# Patient Record
Sex: FEMALE | Race: WHITE | NOT HISPANIC OR LATINO | URBAN - METROPOLITAN AREA
[De-identification: names, ages, dates, MRNs, and addresses within clinical notes are randomized per-mention and may not be internally consistent; named-entity substitution may affect disease eponyms.]

---

## 2017-06-09 ENCOUNTER — INPATIENT (INPATIENT)
Facility: HOSPITAL | Age: 52
LOS: 1 days | Discharge: HOME | DRG: 552 | End: 2017-06-11
Attending: SURGERY | Admitting: SURGERY
Payer: COMMERCIAL

## 2017-06-09 VITALS
OXYGEN SATURATION: 98 % | TEMPERATURE: 98 F | SYSTOLIC BLOOD PRESSURE: 132 MMHG | DIASTOLIC BLOOD PRESSURE: 76 MMHG | HEART RATE: 82 BPM | RESPIRATION RATE: 18 BRPM

## 2017-06-09 DIAGNOSIS — S22.000A WEDGE COMPRESSION FRACTURE OF UNSPECIFIED THORACIC VERTEBRA, INITIAL ENCOUNTER FOR CLOSED FRACTURE: ICD-10-CM
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
## 2017-06-09 DIAGNOSIS — S22.009A UNSPECIFIED FRACTURE OF UNSPECIFIED THORACIC VERTEBRA, INITIAL ENCOUNTER FOR CLOSED FRACTURE: ICD-10-CM

## 2017-06-09 LAB
ALBUMIN SERPL ELPH-MCNC: 4.1 G/DL — SIGNIFICANT CHANGE UP (ref 3.3–5.2)
ALP SERPL-CCNC: 105 U/L — SIGNIFICANT CHANGE UP (ref 40–120)
ALT FLD-CCNC: 15 U/L — SIGNIFICANT CHANGE UP
AMYLASE P1 CFR SERPL: 72 U/L — SIGNIFICANT CHANGE UP (ref 36–128)
ANION GAP SERPL CALC-SCNC: 15 MMOL/L — SIGNIFICANT CHANGE UP (ref 5–17)
APTT BLD: 28.9 SEC — SIGNIFICANT CHANGE UP (ref 27.5–37.4)
AST SERPL-CCNC: 14 U/L — SIGNIFICANT CHANGE UP
BASOPHILS # BLD AUTO: 0.1 K/UL — SIGNIFICANT CHANGE UP (ref 0–0.2)
BASOPHILS NFR BLD AUTO: 0.7 % — SIGNIFICANT CHANGE UP (ref 0–2)
BILIRUB SERPL-MCNC: 0.4 MG/DL — SIGNIFICANT CHANGE UP (ref 0.4–2)
BLD GP AB SCN SERPL QL: SIGNIFICANT CHANGE UP
BUN SERPL-MCNC: 17 MG/DL — SIGNIFICANT CHANGE UP (ref 8–20)
CALCIUM SERPL-MCNC: 9.1 MG/DL — SIGNIFICANT CHANGE UP (ref 8.6–10.2)
CHLORIDE SERPL-SCNC: 105 MMOL/L — SIGNIFICANT CHANGE UP (ref 98–107)
CO2 SERPL-SCNC: 22 MMOL/L — SIGNIFICANT CHANGE UP (ref 22–29)
CREAT SERPL-MCNC: 0.66 MG/DL — SIGNIFICANT CHANGE UP (ref 0.5–1.3)
EOSINOPHIL # BLD AUTO: 0.3 K/UL — SIGNIFICANT CHANGE UP (ref 0–0.5)
EOSINOPHIL NFR BLD AUTO: 3.6 % — SIGNIFICANT CHANGE UP (ref 0–6)
ETHANOL SERPL-MCNC: <10 MG/DL — SIGNIFICANT CHANGE UP
GLUCOSE SERPL-MCNC: 115 MG/DL — SIGNIFICANT CHANGE UP (ref 70–115)
HCT VFR BLD CALC: 44.5 % — SIGNIFICANT CHANGE UP (ref 37–47)
HGB BLD-MCNC: 14.7 G/DL — SIGNIFICANT CHANGE UP (ref 12–16)
INR BLD: 0.92 RATIO — SIGNIFICANT CHANGE UP (ref 0.88–1.16)
LIDOCAIN IGE QN: 36 U/L — SIGNIFICANT CHANGE UP (ref 22–51)
LYMPHOCYTES # BLD AUTO: 2.9 K/UL — SIGNIFICANT CHANGE UP (ref 1–4.8)
LYMPHOCYTES # BLD AUTO: 35.4 % — SIGNIFICANT CHANGE UP (ref 20–55)
MCHC RBC-ENTMCNC: 31.8 PG — HIGH (ref 27–31)
MCHC RBC-ENTMCNC: 33 G/DL — SIGNIFICANT CHANGE UP (ref 32–36)
MCV RBC AUTO: 96.3 FL — SIGNIFICANT CHANGE UP (ref 81–99)
MONOCYTES # BLD AUTO: 0.7 K/UL — SIGNIFICANT CHANGE UP (ref 0–0.8)
MONOCYTES NFR BLD AUTO: 8.1 % — SIGNIFICANT CHANGE UP (ref 3–10)
NEUTROPHILS # BLD AUTO: 4.3 K/UL — SIGNIFICANT CHANGE UP (ref 1.8–8)
NEUTROPHILS NFR BLD AUTO: 51.8 % — SIGNIFICANT CHANGE UP (ref 37–73)
PLATELET # BLD AUTO: 273 K/UL — SIGNIFICANT CHANGE UP (ref 150–400)
POTASSIUM SERPL-MCNC: 4.2 MMOL/L — SIGNIFICANT CHANGE UP (ref 3.5–5.3)
POTASSIUM SERPL-SCNC: 4.2 MMOL/L — SIGNIFICANT CHANGE UP (ref 3.5–5.3)
PROT SERPL-MCNC: 7 G/DL — SIGNIFICANT CHANGE UP (ref 6.6–8.7)
PROTHROM AB SERPL-ACNC: 10.1 SEC — SIGNIFICANT CHANGE UP (ref 9.8–12.7)
RBC # BLD: 4.62 M/UL — SIGNIFICANT CHANGE UP (ref 4.4–5.2)
RBC # FLD: 14 % — SIGNIFICANT CHANGE UP (ref 11–15.6)
SODIUM SERPL-SCNC: 142 MMOL/L — SIGNIFICANT CHANGE UP (ref 135–145)
TYPE + AB SCN PNL BLD: SIGNIFICANT CHANGE UP
WBC # BLD: 8.3 K/UL — SIGNIFICANT CHANGE UP (ref 4.8–10.8)
WBC # FLD AUTO: 8.3 K/UL — SIGNIFICANT CHANGE UP (ref 4.8–10.8)

## 2017-06-09 PROCEDURE — 72146 MRI CHEST SPINE W/O DYE: CPT | Mod: 26

## 2017-06-09 PROCEDURE — 73030 X-RAY EXAM OF SHOULDER: CPT | Mod: 26,LT

## 2017-06-09 PROCEDURE — 71010: CPT | Mod: 26

## 2017-06-09 PROCEDURE — 93010 ELECTROCARDIOGRAM REPORT: CPT

## 2017-06-09 PROCEDURE — 72125 CT NECK SPINE W/O DYE: CPT | Mod: 26

## 2017-06-09 PROCEDURE — 70450 CT HEAD/BRAIN W/O DYE: CPT | Mod: 26

## 2017-06-09 PROCEDURE — 99291 CRITICAL CARE FIRST HOUR: CPT

## 2017-06-09 PROCEDURE — 71260 CT THORAX DX C+: CPT | Mod: 26

## 2017-06-09 PROCEDURE — 74177 CT ABD & PELVIS W/CONTRAST: CPT | Mod: 26

## 2017-06-09 RX ORDER — SODIUM CHLORIDE 9 MG/ML
1000 INJECTION, SOLUTION INTRAVENOUS
Qty: 0 | Refills: 0 | Status: DISCONTINUED | OUTPATIENT
Start: 2017-06-09 | End: 2017-06-11

## 2017-06-09 RX ORDER — ACETAMINOPHEN 500 MG
650 TABLET ORAL ONCE
Qty: 0 | Refills: 0 | Status: COMPLETED | OUTPATIENT
Start: 2017-06-09 | End: 2017-06-09

## 2017-06-09 RX ORDER — MORPHINE SULFATE 50 MG/1
4 CAPSULE, EXTENDED RELEASE ORAL EVERY 4 HOURS
Qty: 0 | Refills: 0 | Status: DISCONTINUED | OUTPATIENT
Start: 2017-06-09 | End: 2017-06-11

## 2017-06-09 RX ORDER — NICOTINE POLACRILEX 2 MG
1 GUM BUCCAL DAILY
Qty: 0 | Refills: 0 | Status: DISCONTINUED | OUTPATIENT
Start: 2017-06-09 | End: 2017-06-11

## 2017-06-09 RX ORDER — ENOXAPARIN SODIUM 100 MG/ML
40 INJECTION SUBCUTANEOUS EVERY 24 HOURS
Qty: 0 | Refills: 0 | Status: DISCONTINUED | OUTPATIENT
Start: 2017-06-09 | End: 2017-06-11

## 2017-06-09 RX ORDER — DOCUSATE SODIUM 100 MG
100 CAPSULE ORAL THREE TIMES A DAY
Qty: 0 | Refills: 0 | Status: DISCONTINUED | OUTPATIENT
Start: 2017-06-09 | End: 2017-06-11

## 2017-06-09 RX ADMIN — SODIUM CHLORIDE 100 MILLILITER(S): 9 INJECTION, SOLUTION INTRAVENOUS at 15:43

## 2017-06-09 RX ADMIN — MORPHINE SULFATE 4 MILLIGRAM(S): 50 CAPSULE, EXTENDED RELEASE ORAL at 20:55

## 2017-06-09 RX ADMIN — MORPHINE SULFATE 4 MILLIGRAM(S): 50 CAPSULE, EXTENDED RELEASE ORAL at 21:00

## 2017-06-09 RX ADMIN — Medication 100 MILLIGRAM(S): at 21:39

## 2017-06-09 RX ADMIN — Medication 650 MILLIGRAM(S): at 13:20

## 2017-06-09 RX ADMIN — Medication 1 PATCH: at 17:17

## 2017-06-09 RX ADMIN — Medication 650 MILLIGRAM(S): at 13:52
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
## 2017-06-09 NOTE — ED ADULT NURSE REASSESSMENT NOTE - NS ED NURSE REASSESS COMMENT FT1
Ortho PA at bedside, pt stable, resp even unlabored, family and pt aware of plan of care. will continue to monitor
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
## 2017-06-09 NOTE — ED PROVIDER NOTE - MUSCULOSKELETAL, MLM
Spine appears normal, range of motion is not limited, when logged rolled pos bilateral knee and left shoulder tenderness mid thoracic and sacral tenderness as per tt exam
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
## 2017-06-09 NOTE — H&P ADULT - NSHPREVIEWOFSYSTEMS_GEN_ALL_CORE
General: denies fever, chills  Head: denies HA, N/V  Cardiac: denies HTN, murmurs  Respiratory: Admits to being a 1-1.5 PPD smoker since she was 17 y/o.  Denies SOB, cough  GI: Denies diarrhea, constipation  Urinary: Denies urgency or incontinence  MS: Admits to L shoulder pain.  Denies weakness.

## 2017-06-09 NOTE — ED PROVIDER NOTE - CONSTITUTIONAL, MLM
normal... Well appearing, well nourished, awake, alert, oriented to person, place, time/situation and anxious
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
## 2017-06-09 NOTE — H&P ADULT - NSHPPHYSICALEXAM_GEN_ALL_CORE
Constitutional: BIBEMS, nervous  Eyes: PERRLA, EOM intact, pupil diameter=3mm  Head: NCAT  Neck: trachea midline, CCollar in place, cervical spine tenderness   Respiratory: CTA b/l, no WRR  Cardiovascular: S1S2, RRR  Gastrointestinal: abd. obese, soft, NT/ND, +BSx4   Extremities: no LE edema b/l, FROMx4, L shoulder tenderness on palpation  Neurological: AOx3, sensation grossly intact  Skin: warm, dry, intact, ecchymosis over L shoulder

## 2017-06-10 DIAGNOSIS — S22.009A UNSPECIFIED FRACTURE OF UNSPECIFIED THORACIC VERTEBRA, INITIAL ENCOUNTER FOR CLOSED FRACTURE: ICD-10-CM

## 2017-06-10 LAB
ANION GAP SERPL CALC-SCNC: 13 MMOL/L — SIGNIFICANT CHANGE UP (ref 5–17)
BASOPHILS # BLD AUTO: 0.1 K/UL — SIGNIFICANT CHANGE UP (ref 0–0.2)
BASOPHILS NFR BLD AUTO: 1.1 % — SIGNIFICANT CHANGE UP (ref 0–2)
BUN SERPL-MCNC: 11 MG/DL — SIGNIFICANT CHANGE UP (ref 8–20)
CALCIUM SERPL-MCNC: 8.8 MG/DL — SIGNIFICANT CHANGE UP (ref 8.6–10.2)
CHLORIDE SERPL-SCNC: 105 MMOL/L — SIGNIFICANT CHANGE UP (ref 98–107)
CO2 SERPL-SCNC: 24 MMOL/L — SIGNIFICANT CHANGE UP (ref 22–29)
CREAT SERPL-MCNC: 0.65 MG/DL — SIGNIFICANT CHANGE UP (ref 0.5–1.3)
EOSINOPHIL # BLD AUTO: 0.3 K/UL — SIGNIFICANT CHANGE UP (ref 0–0.5)
EOSINOPHIL NFR BLD AUTO: 3.6 % — SIGNIFICANT CHANGE UP (ref 0–6)
GLUCOSE SERPL-MCNC: 94 MG/DL — SIGNIFICANT CHANGE UP (ref 70–115)
HCT VFR BLD CALC: 41.6 % — SIGNIFICANT CHANGE UP (ref 37–47)
HGB BLD-MCNC: 13.7 G/DL — SIGNIFICANT CHANGE UP (ref 12–16)
LACTATE SERPL-SCNC: 1.1 MMOL/L — SIGNIFICANT CHANGE UP (ref 0.5–2)
LYMPHOCYTES # BLD AUTO: 3.8 K/UL — SIGNIFICANT CHANGE UP (ref 1–4.8)
LYMPHOCYTES # BLD AUTO: 45.5 % — SIGNIFICANT CHANGE UP (ref 20–55)
MAGNESIUM SERPL-MCNC: 2 MG/DL — SIGNIFICANT CHANGE UP (ref 1.6–2.6)
MCHC RBC-ENTMCNC: 31.8 PG — HIGH (ref 27–31)
MCHC RBC-ENTMCNC: 32.9 G/DL — SIGNIFICANT CHANGE UP (ref 32–36)
MCV RBC AUTO: 96.5 FL — SIGNIFICANT CHANGE UP (ref 81–99)
MONOCYTES # BLD AUTO: 0.7 K/UL — SIGNIFICANT CHANGE UP (ref 0–0.8)
MONOCYTES NFR BLD AUTO: 8 % — SIGNIFICANT CHANGE UP (ref 3–10)
NEUTROPHILS # BLD AUTO: 3.4 K/UL — SIGNIFICANT CHANGE UP (ref 1.8–8)
NEUTROPHILS NFR BLD AUTO: 41.6 % — SIGNIFICANT CHANGE UP (ref 37–73)
PHOSPHATE SERPL-MCNC: 3.4 MG/DL — SIGNIFICANT CHANGE UP (ref 2.4–4.7)
PLATELET # BLD AUTO: 246 K/UL — SIGNIFICANT CHANGE UP (ref 150–400)
POTASSIUM SERPL-MCNC: 3.7 MMOL/L — SIGNIFICANT CHANGE UP (ref 3.5–5.3)
POTASSIUM SERPL-SCNC: 3.7 MMOL/L — SIGNIFICANT CHANGE UP (ref 3.5–5.3)
RBC # BLD: 4.31 M/UL — LOW (ref 4.4–5.2)
RBC # FLD: 13.9 % — SIGNIFICANT CHANGE UP (ref 11–15.6)
SODIUM SERPL-SCNC: 142 MMOL/L — SIGNIFICANT CHANGE UP (ref 135–145)
WBC # BLD: 8.3 K/UL — SIGNIFICANT CHANGE UP (ref 4.8–10.8)
WBC # FLD AUTO: 8.3 K/UL — SIGNIFICANT CHANGE UP (ref 4.8–10.8)

## 2017-06-10 PROCEDURE — 93010 ELECTROCARDIOGRAM REPORT: CPT

## 2017-06-10 RX ORDER — POTASSIUM CHLORIDE 20 MEQ
40 PACKET (EA) ORAL ONCE
Qty: 0 | Refills: 0 | Status: DISCONTINUED | OUTPATIENT
Start: 2017-06-10 | End: 2017-06-10

## 2017-06-10 RX ORDER — LANOLIN ALCOHOL/MO/W.PET/CERES
3 CREAM (GRAM) TOPICAL AT BEDTIME
Qty: 0 | Refills: 0 | Status: DISCONTINUED | OUTPATIENT
Start: 2017-06-10 | End: 2017-06-11

## 2017-06-10 RX ORDER — POTASSIUM CHLORIDE 20 MEQ
40 PACKET (EA) ORAL ONCE
Qty: 0 | Refills: 0 | Status: COMPLETED | OUTPATIENT
Start: 2017-06-10 | End: 2017-06-10

## 2017-06-10 RX ADMIN — Medication 100 MILLIGRAM(S): at 04:18

## 2017-06-10 RX ADMIN — Medication 1 PATCH: at 11:32

## 2017-06-10 RX ADMIN — Medication 1 PATCH: at 17:39

## 2017-06-10 RX ADMIN — Medication 100 MILLIGRAM(S): at 13:24

## 2017-06-10 RX ADMIN — Medication 40 MILLIEQUIVALENT(S): at 11:32

## 2017-06-10 NOTE — PHYSICAL THERAPY INITIAL EVALUATION ADULT - LEVEL OF INDEPENDENCE: GAIT, REHAB EVAL
Pt ambulated 30 feet without device--unsteady and increased pain. Introduced RW, pt ambulated 100 additional feet with rolling walker

## 2017-06-10 NOTE — PROGRESS NOTE ADULT - PROBLEM SELECTOR PLAN 1
-f/u official MRI spine results  -pain management  -bedrest  -f/u ortho recommendations  -dvt ppx  -regular diet

## 2017-06-10 NOTE — PROGRESS NOTE ADULT - ATTENDING COMMENTS
Agree with above.  The patient is being fit with a TLSO brace as the MRI reveals an acute T1 and T7 fracture.  The patient will need to continue with pain control.  If pain is controlled and improved with the aid of the TLSO brace then the patient may possibly be readied for discharge home with ortho spine follow up.  Otherwise will need to continue pain control until able to mobilize.

## 2017-06-10 NOTE — PROGRESS NOTE ADULT - SUBJECTIVE AND OBJECTIVE BOX
SUBJECTIVE: Patient seen and examined at bedside. No acute events overnight. Pt continues to complain of thoracic back pain as well as left should and left thigh pain. Pt states her pain is well controlled with current pain medications. Pt remains NPO for any potential orthopedic procedure today. Pt had MRI yesterday. Pt denies fever, chills, nausea, emesis, shortness of breath or chest pain.      MEDICATIONS  (STANDING):  enoxaparin Injectable 40milliGRAM(s) SubCutaneous every 24 hours  lactated ringers. 1000milliLiter(s) IV Continuous <Continuous>  docusate sodium 100milliGRAM(s) Oral three times a day  nicotine - 21 mG/24Hr(s) Patch 1patch Transdermal daily    MEDICATIONS  (PRN):  oxyCODONE  5 mG/acetaminophen 325 mG 1Tablet(s) Oral every 6 hours PRN Moderate Pain  oxyCODONE  5 mG/acetaminophen 325 mG 2Tablet(s) Oral every 6 hours PRN Severe Pain  morphine  - Injectable 4milliGRAM(s) IV Push every 4 hours PRN Breakthrough pain      Vital Signs Last 24 Hrs  T(C): 36.4, Max: 36.8 (06-09 @ 16:36)  T(F): 97.6, Max: 98.2 (06-09 @ 16:36)  HR: 76 (69 - 82)  BP: 102/56 (102/56 - 167/82)  BP(mean): --  RR: 18 (18 - 18)  SpO2: 100% (94% - 100%)    PE  Gen: NAD, AAOx3  Pulm: CTAB  CV: RRR, normal intensity s1/s2  Abd: soft, nontender, nondistended  Ext: moving all extremities. left should ROM slightly decreased due to pain, 5/5 strength, nontender. left thigh with minimal tenderness, full ROM, strength 5/5. moving all digits bilaterally. sensation to LT intact  Vasc: 2+ peripheral pulses  Neuro: GCS 15, nonfocal      I&O's Detail      LABS:                        14.7   8.3   )-----------( 273      ( 09 Jun 2017 11:33 )             44.5     06-09    142  |  105  |  17.0  ----------------------------<  115  4.2   |  22.0  |  0.66    Ca    9.1      09 Jun 2017 11:33    TPro  7.0  /  Alb  4.1  /  TBili  0.4  /  DBili  x   /  AST  14  /  ALT  15  /  AlkPhos  105  06-09    PT/INR - ( 09 Jun 2017 11:33 )   PT: 10.1 sec;   INR: 0.92 ratio         PTT - ( 09 Jun 2017 11:33 )  PTT:28.9 sec      RADIOLOGY & ADDITIONAL STUDIES:

## 2017-06-10 NOTE — PHYSICAL THERAPY INITIAL EVALUATION ADULT - ACTIVE RANGE OF MOTION EXAMINATION, REHAB EVAL
spinal precautions maintained/bilateral upper extremity Active ROM was WFL (within functional limits)/bilateral  lower extremity Active ROM was WFL (within functional limits)
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
## 2017-06-10 NOTE — PHYSICAL THERAPY INITIAL EVALUATION ADULT - ADDITIONAL COMMENTS
Pt reports living with spouse and 3 adult children in a private house with 1 step to enter. Spouse and children work during the day. Pt is independent and active at baseline. Walks 4 miles a day. Denies owning any DME.

## 2017-06-11 VITALS
SYSTOLIC BLOOD PRESSURE: 105 MMHG | OXYGEN SATURATION: 98 % | DIASTOLIC BLOOD PRESSURE: 70 MMHG | RESPIRATION RATE: 17 BRPM | HEART RATE: 70 BPM | TEMPERATURE: 98 F

## 2017-06-11 PROCEDURE — 36415 COLL VENOUS BLD VENIPUNCTURE: CPT

## 2017-06-11 PROCEDURE — 73030 X-RAY EXAM OF SHOULDER: CPT

## 2017-06-11 PROCEDURE — 97110 THERAPEUTIC EXERCISES: CPT

## 2017-06-11 PROCEDURE — 71045 X-RAY EXAM CHEST 1 VIEW: CPT

## 2017-06-11 PROCEDURE — 80048 BASIC METABOLIC PNL TOTAL CA: CPT

## 2017-06-11 PROCEDURE — 85610 PROTHROMBIN TIME: CPT

## 2017-06-11 PROCEDURE — 80053 COMPREHEN METABOLIC PANEL: CPT

## 2017-06-11 PROCEDURE — 86900 BLOOD TYPING SEROLOGIC ABO: CPT

## 2017-06-11 PROCEDURE — 83735 ASSAY OF MAGNESIUM: CPT

## 2017-06-11 PROCEDURE — G0390: CPT

## 2017-06-11 PROCEDURE — 72146 MRI CHEST SPINE W/O DYE: CPT

## 2017-06-11 PROCEDURE — 85027 COMPLETE CBC AUTOMATED: CPT

## 2017-06-11 PROCEDURE — 84100 ASSAY OF PHOSPHORUS: CPT

## 2017-06-11 PROCEDURE — 82150 ASSAY OF AMYLASE: CPT

## 2017-06-11 PROCEDURE — 71260 CT THORAX DX C+: CPT

## 2017-06-11 PROCEDURE — 70450 CT HEAD/BRAIN W/O DYE: CPT

## 2017-06-11 PROCEDURE — 97116 GAIT TRAINING THERAPY: CPT

## 2017-06-11 PROCEDURE — 97163 PT EVAL HIGH COMPLEX 45 MIN: CPT

## 2017-06-11 PROCEDURE — 72125 CT NECK SPINE W/O DYE: CPT

## 2017-06-11 PROCEDURE — 86901 BLOOD TYPING SEROLOGIC RH(D): CPT

## 2017-06-11 PROCEDURE — 80307 DRUG TEST PRSMV CHEM ANLYZR: CPT

## 2017-06-11 PROCEDURE — 74177 CT ABD & PELVIS W/CONTRAST: CPT

## 2017-06-11 PROCEDURE — 93005 ELECTROCARDIOGRAM TRACING: CPT

## 2017-06-11 PROCEDURE — 83690 ASSAY OF LIPASE: CPT

## 2017-06-11 PROCEDURE — 86850 RBC ANTIBODY SCREEN: CPT

## 2017-06-11 PROCEDURE — 83605 ASSAY OF LACTIC ACID: CPT

## 2017-06-11 PROCEDURE — 99291 CRITICAL CARE FIRST HOUR: CPT | Mod: 25

## 2017-06-11 PROCEDURE — 85730 THROMBOPLASTIN TIME PARTIAL: CPT

## 2017-06-11 RX ORDER — OXYCODONE HYDROCHLORIDE 5 MG/1
1 TABLET ORAL
Qty: 0 | Refills: 0 | COMMUNITY
Start: 2017-06-11

## 2017-06-11 RX ORDER — OXYCODONE HYDROCHLORIDE 5 MG/1
2 TABLET ORAL
Qty: 0 | Refills: 0 | COMMUNITY
Start: 2017-06-11

## 2017-06-11 RX ORDER — OXYCODONE HYDROCHLORIDE 5 MG/1
1 TABLET ORAL
Qty: 30 | Refills: 0 | OUTPATIENT
Start: 2017-06-11

## 2017-06-11 RX ADMIN — Medication 3 MILLIGRAM(S): at 00:17

## 2017-06-11 RX ADMIN — Medication 1 PATCH: at 13:00

## 2017-06-11 RX ADMIN — Medication 1 PATCH: at 11:55

## 2017-06-11 RX ADMIN — SODIUM CHLORIDE 100 MILLILITER(S): 9 INJECTION, SOLUTION INTRAVENOUS at 02:50
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
## 2017-06-11 NOTE — DISCHARGE NOTE ADULT - NS AS ACTIVITY OBS
Sex allowed/Walking-Outdoors allowed/Do not make important decisions/Do not drive or operate machinery/Showering allowed/No Heavy lifting/straining/Stairs allowed/Walking-Indoors allowed

## 2017-06-11 NOTE — DISCHARGE NOTE ADULT - CARE PROVIDER_API CALL
acute care surgery,   [address]  [facility] [address]  Phone: [phone]  Fax: (   )    -
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
## 2017-06-11 NOTE — PROGRESS NOTE ADULT - SUBJECTIVE AND OBJECTIVE BOX
INTERVAL HPI/OVERNIGHT EVENTS: No acute events overnight.  Received TLSO brace and ambulated with PT.  Awaiting PT session today for stair training, then anticipated DC home.  Denies f/c/n/v.  Reports stable pain in low back.  Denies chest pain, dyspnea.    MEDICATIONS  (STANDING):  enoxaparin Injectable 40milliGRAM(s) SubCutaneous every 24 hours  docusate sodium 100milliGRAM(s) Oral three times a day  nicotine - 21 mG/24Hr(s) Patch 1patch Transdermal daily  melatonin 3milliGRAM(s) Oral at bedtime    MEDICATIONS  (PRN):  oxyCODONE  5 mG/acetaminophen 325 mG 1Tablet(s) Oral every 6 hours PRN Moderate Pain  oxyCODONE  5 mG/acetaminophen 325 mG 2Tablet(s) Oral every 6 hours PRN Severe Pain  morphine  - Injectable 4milliGRAM(s) IV Push every 4 hours PRN Breakthrough pain      Vital Signs Last 24 Hrs  T(C): 36.7, Max: 36.8 (06-10 @ 15:46)  T(F): 98.1, Max: 98.2 (06-10 @ 15:46)  HR: 70 (68 - 79)  BP: 103/68 (100/68 - 112/62)  BP(mean): --  RR: 18 (18 - 19)  SpO2: 98% (98% - 100%)    Constitutional: NAD, well-groomed, well-developed  HEENT: PERRLA, EOMI  Back: Normal spine flexure, No CVA tenderness, No deformity, slight limitation of movement due to pain  Respiratory: Breath Sounds equal & clear to percussion & auscultation, no accessory muscle use  Cardiovascular: Regular rate & rhythm, normal S1, S2; no murmurs, gallops or rubs; no S3, S4  Gastrointestinal: Soft, non-tender, no hepatosplenomegaly, normal bowel sounds  Extremities: No peripheral edema, No cyanosis, clubbing   Vascular: Equal and normal pulses: 2+ peripheral pulses throughout  Neurological: GCS: 15. A&O x 3; no sensory, motor or coordination deficits, normal reflexes  Psychiatric: Normal mood, normal affect  Musculoskeletal: No joint pain, swelling or deformity  Skin: No rashes      I&O's Detail    I & Os for current day (as of 11 Jun 2017 11:38)  =============================================  IN:    lactated ringers.: 1100 ml    Total IN: 1100 ml  ---------------------------------------------  OUT:    Voided: 1100 ml    Total OUT: 1100 ml  ---------------------------------------------  Total NET: 0 ml      LABS:                        13.7   8.3   )-----------( 246      ( 10 Aris 2017 08:33 )             41.6     06-10    142  |  105  |  11.0  ----------------------------<  94  3.7   |  24.0  |  0.65    Ca    8.8      10 Aris 2017 08:33  Phos  3.4     06-10  Mg     2.0     06-10      PT/INR - ( 10 Aris 2017 08:37 )   PT: 11.0 sec;   INR: 1.00 ratio         PTT - ( 10 Aris 2017 08:37 )  PTT:30.0 sec

## 2017-06-11 NOTE — DISCHARGE NOTE ADULT - PATIENT PORTAL LINK FT
“You can access the [employer] Patient Portal, offered by [facility], by registering with the following website: [email or web address]”

## 2017-06-11 NOTE — DISCHARGE NOTE ADULT - MEDICATION SUMMARY - MEDICATIONS TO TAKE
I will START or STAY ON the medications listed below when I get home from the hospital:    acetaminophen-oxycodone 325 mg-5 mg oral tablet  -- 2 tab(s) by mouth every 6 hours, As needed, Severe Pain  -- Indication: For Closed fracture of thoracic vertebra    acetaminophen-oxycodone 325 mg-5 mg oral tablet  -- 1-2  tab(s) by mouth every 4- 6 hours, As needed for Moderate to severe pain MDD:6 tablets  -- Indication: For Closed fracture of thoracic vertebra

## 2017-06-11 NOTE — DISCHARGE NOTE ADULT - PROVIDER TOKENS
FREE:[LAST:[acute care surgery],PHONE:[phone]],FAX:[(   [phone]    -],ADDRESS:[[address] [place] [address]]]

## 2017-06-11 NOTE — DISCHARGE NOTE ADULT - PLAN OF CARE
To be pain free and return to daily activities of living Please follow up with Dr. Vivas this week, please call the number provided on your discharge papers. Please wear your brace out of bed and when ambulating. While on narcotics do not drive, operate heavy machinery , make any important decisions, drink alcohol, take any tylenol. Please do not drive until Dr. Vivas (Spine surgeon ) clears you to drive. For any issues , or concerns, if you should experience chest pain, shortness of breath, fever, chills, inability to ambulate, loss of sensation in your extremities, or any changes at all please go to your nearest ER or call 911 incidental cystic and solid left ovarian lesion seen on ct of abdomen, an incidental signed at the bedside, please followup with PMD and or gynocologist to update them on recent findings.

## 2017-06-11 NOTE — DISCHARGE NOTE ADULT - CARE PLAN
Principal Discharge DX:	Fracture of thoracic spine  Goal:	To be pain free and return to daily activities of living  Instructions for follow-up, activity and diet:	Please follow up with Dr. Vivas this week, please call the number provided on your discharge papers. Please wear your brace out of bed and when ambulating. While on narcotics do not drive, operate heavy machinery , make any important decisions, drink alcohol, take any tylenol. Please do not drive until Dr. Vivas (Spine surgeon ) clears you to drive. For any issues , or concerns, if you should experience chest pain, shortness of breath, fever, chills, inability to ambulate, loss of sensation in your extremities, or any changes at all please go to your nearest ER or call 911  Secondary Diagnosis:	Cyst of ovary, left  Instructions for follow-up, activity and diet:	incidental cystic and solid left ovarian lesion seen on ct of abdomen, an incidental signed at the bedside, please followup with PMD and or gynocologist to update them on recent findings.

## 2017-06-27 ENCOUNTER — APPOINTMENT (OUTPATIENT)
Dept: TRAUMA SURGERY | Facility: CLINIC | Age: 52
End: 2017-06-27

## 2017-06-28 ENCOUNTER — APPOINTMENT (OUTPATIENT)
Dept: ORTHOPEDIC SURGERY | Facility: CLINIC | Age: 52
End: 2017-06-28

## 2017-06-28 VITALS
BODY MASS INDEX: 31.99 KG/M2 | HEIGHT: 65 IN | HEART RATE: 72 BPM | SYSTOLIC BLOOD PRESSURE: 120 MMHG | WEIGHT: 192 LBS | DIASTOLIC BLOOD PRESSURE: 80 MMHG

## 2017-06-28 RX ORDER — DOXYCYCLINE 100 MG/1
100 CAPSULE ORAL
Qty: 14 | Refills: 0 | Status: ACTIVE | COMMUNITY
Start: 2017-01-05

## 2017-06-28 RX ORDER — PREDNISONE 20 MG/1
20 TABLET ORAL
Qty: 10 | Refills: 0 | Status: ACTIVE | COMMUNITY
Start: 2017-01-05

## 2017-06-28 RX ORDER — OXYCODONE AND ACETAMINOPHEN 5; 325 MG/1; MG/1
5-325 TABLET ORAL
Qty: 60 | Refills: 0 | Status: ACTIVE | COMMUNITY
Start: 2017-06-20

## 2017-06-29 ENCOUNTER — OTHER (OUTPATIENT)
Age: 52
End: 2017-06-29

## 2017-07-12 ENCOUNTER — OTHER (OUTPATIENT)
Age: 52
End: 2017-07-12

## 2017-07-12 RX ORDER — OXYCODONE AND ACETAMINOPHEN 5; 325 MG/1; MG/1
5-325 TABLET ORAL
Qty: 60 | Refills: 0 | Status: ACTIVE | COMMUNITY
Start: 2017-06-30 | End: 1900-01-01

## 2017-07-12 RX ORDER — NAPROXEN 500 MG/1
500 TABLET ORAL
Qty: 30 | Refills: 0 | Status: ACTIVE | COMMUNITY
Start: 2017-07-12 | End: 1900-01-01

## 2017-07-12 RX ORDER — TIZANIDINE 2 MG/1
2 TABLET ORAL
Qty: 45 | Refills: 0 | Status: ACTIVE | COMMUNITY
Start: 2017-07-12 | End: 1900-01-01

## 2017-07-21 ENCOUNTER — APPOINTMENT (OUTPATIENT)
Dept: OBGYN | Facility: CLINIC | Age: 52
End: 2017-07-21
Payer: COMMERCIAL

## 2017-07-21 VITALS
HEIGHT: 65 IN | DIASTOLIC BLOOD PRESSURE: 80 MMHG | BODY MASS INDEX: 33.32 KG/M2 | SYSTOLIC BLOOD PRESSURE: 120 MMHG | WEIGHT: 200 LBS

## 2017-07-21 DIAGNOSIS — B96.89 ACUTE VAGINITIS: ICD-10-CM

## 2017-07-21 DIAGNOSIS — N76.0 ACUTE VAGINITIS: ICD-10-CM

## 2017-07-21 DIAGNOSIS — M85.80 OTHER SPECIFIED DISORDERS OF BONE DENSITY AND STRUCTURE, UNSPECIFIED SITE: ICD-10-CM

## 2017-07-21 DIAGNOSIS — Z01.419 ENCOUNTER FOR GYNECOLOGICAL EXAMINATION (GENERAL) (ROUTINE) W/OUT ABNORMAL FINDINGS: ICD-10-CM

## 2017-07-21 PROCEDURE — 99396 PREV VISIT EST AGE 40-64: CPT

## 2017-07-21 PROCEDURE — 82270 OCCULT BLOOD FECES: CPT

## 2017-07-21 RX ORDER — METRONIDAZOLE 7.5 MG/G
0.75 GEL VAGINAL
Qty: 1 | Refills: 0 | Status: ACTIVE | COMMUNITY
Start: 2017-07-21 | End: 1900-01-01

## 2017-07-24 LAB — HPV HIGH+LOW RISK DNA PNL CVX: NEGATIVE

## 2017-07-27 ENCOUNTER — CLINICAL ADVICE (OUTPATIENT)
Age: 52
End: 2017-07-27

## 2017-07-27 DIAGNOSIS — N63 UNSPECIFIED LUMP IN BREAST: ICD-10-CM

## 2017-07-27 DIAGNOSIS — R19.00 INTRA-ABDOMINAL AND PELVIC SWELLING, MASS AND LUMP, UNSPECIFIED SITE: ICD-10-CM

## 2017-07-27 LAB — CYTOLOGY CVX/VAG DOC THIN PREP: NORMAL

## 2017-07-28 ENCOUNTER — APPOINTMENT (OUTPATIENT)
Dept: ORTHOPEDIC SURGERY | Facility: CLINIC | Age: 52
End: 2017-07-28
Payer: COMMERCIAL

## 2017-07-28 VITALS
HEIGHT: 66 IN | SYSTOLIC BLOOD PRESSURE: 120 MMHG | DIASTOLIC BLOOD PRESSURE: 80 MMHG | WEIGHT: 200 LBS | BODY MASS INDEX: 32.14 KG/M2 | HEART RATE: 74 BPM

## 2017-07-28 PROCEDURE — 99214 OFFICE O/P EST MOD 30 MIN: CPT

## 2017-07-28 PROCEDURE — 72070 X-RAY EXAM THORAC SPINE 2VWS: CPT

## 2017-07-28 RX ORDER — NAPROXEN 500 MG/1
500 TABLET ORAL
Qty: 60 | Refills: 2 | Status: ACTIVE | COMMUNITY
Start: 2017-07-28 | End: 1900-01-01

## 2017-07-28 RX ORDER — TIZANIDINE HYDROCHLORIDE 2 MG/1
2 CAPSULE ORAL 3 TIMES DAILY
Qty: 90 | Refills: 3 | Status: ACTIVE | COMMUNITY
Start: 2017-07-28 | End: 1900-01-01

## 2017-07-28 RX ORDER — OXYCODONE AND ACETAMINOPHEN 5; 325 MG/1; MG/1
5-325 TABLET ORAL
Qty: 40 | Refills: 0 | Status: ACTIVE | COMMUNITY
Start: 2017-07-28 | End: 1900-01-01

## 2017-08-04 ENCOUNTER — APPOINTMENT (OUTPATIENT)
Dept: MRI IMAGING | Facility: CLINIC | Age: 52
End: 2017-08-04

## 2017-08-04 ENCOUNTER — APPOINTMENT (OUTPATIENT)
Dept: ULTRASOUND IMAGING | Facility: CLINIC | Age: 52
End: 2017-08-04

## 2017-08-11 RX ORDER — NAPROXEN 500 MG/1
500 TABLET ORAL
Qty: 60 | Refills: 0 | Status: ACTIVE | COMMUNITY
Start: 2017-08-11 | End: 1900-01-01

## 2017-08-11 RX ORDER — OXYCODONE AND ACETAMINOPHEN 5; 325 MG/1; MG/1
5-325 TABLET ORAL
Qty: 40 | Refills: 0 | Status: ACTIVE | COMMUNITY
Start: 2017-08-11 | End: 1900-01-01

## 2017-08-25 ENCOUNTER — APPOINTMENT (OUTPATIENT)
Dept: ORTHOPEDIC SURGERY | Facility: CLINIC | Age: 52
End: 2017-08-25

## 2017-08-25 RX ORDER — TIZANIDINE 4 MG/1
4 TABLET ORAL EVERY 8 HOURS
Qty: 60 | Refills: 0 | Status: ACTIVE | COMMUNITY
Start: 2017-08-11 | End: 1900-01-01

## 2017-08-25 RX ORDER — OXYCODONE AND ACETAMINOPHEN 5; 325 MG/1; MG/1
5-325 TABLET ORAL
Qty: 15 | Refills: 0 | Status: ACTIVE | COMMUNITY
Start: 2017-08-25 | End: 1900-01-01

## 2017-09-05 ENCOUNTER — OTHER (OUTPATIENT)
Age: 52
End: 2017-09-05

## 2017-09-05 RX ORDER — OXYCODONE AND ACETAMINOPHEN 5; 325 MG/1; MG/1
5-325 TABLET ORAL TWICE DAILY
Qty: 20 | Refills: 0 | Status: ACTIVE | COMMUNITY
Start: 2017-09-05 | End: 1900-01-01

## 2017-09-05 RX ORDER — TIZANIDINE 4 MG/1
4 TABLET ORAL AT BEDTIME
Qty: 30 | Refills: 0 | Status: ACTIVE | COMMUNITY
Start: 2017-09-05 | End: 1900-01-01

## 2017-09-05 RX ORDER — NAPROXEN 500 MG/1
500 TABLET ORAL
Qty: 60 | Refills: 1 | Status: ACTIVE | COMMUNITY
Start: 2017-09-05 | End: 1900-01-01

## 2017-09-08 ENCOUNTER — APPOINTMENT (OUTPATIENT)
Dept: ORTHOPEDIC SURGERY | Facility: CLINIC | Age: 52
End: 2017-09-08
Payer: COMMERCIAL

## 2017-09-08 VITALS — WEIGHT: 200 LBS | BODY MASS INDEX: 32.14 KG/M2 | HEIGHT: 66 IN

## 2017-09-08 PROCEDURE — 72100 X-RAY EXAM L-S SPINE 2/3 VWS: CPT

## 2017-09-08 PROCEDURE — 99214 OFFICE O/P EST MOD 30 MIN: CPT

## 2017-09-12 ENCOUNTER — APPOINTMENT (OUTPATIENT)
Dept: ORTHOPEDIC SURGERY | Facility: CLINIC | Age: 52
End: 2017-09-12
Payer: COMMERCIAL

## 2017-09-12 VITALS — BODY MASS INDEX: 32.14 KG/M2 | WEIGHT: 200 LBS | HEIGHT: 66 IN

## 2017-09-12 PROCEDURE — 99214 OFFICE O/P EST MOD 30 MIN: CPT

## 2017-09-15 ENCOUNTER — MESSAGE (OUTPATIENT)
Age: 52
End: 2017-09-15

## 2017-09-19 ENCOUNTER — OTHER (OUTPATIENT)
Age: 52
End: 2017-09-19

## 2017-10-10 ENCOUNTER — APPOINTMENT (OUTPATIENT)
Dept: ORTHOPEDIC SURGERY | Facility: CLINIC | Age: 52
End: 2017-10-10
Payer: COMMERCIAL

## 2017-10-10 VITALS
HEART RATE: 111 BPM | HEIGHT: 66 IN | WEIGHT: 200 LBS | SYSTOLIC BLOOD PRESSURE: 131 MMHG | BODY MASS INDEX: 32.14 KG/M2 | DIASTOLIC BLOOD PRESSURE: 80 MMHG

## 2017-10-10 DIAGNOSIS — M47.812 SPONDYLOSIS W/OUT MYELOPATHY OR RADICULOPATHY, CERVICAL REGION: ICD-10-CM

## 2017-10-10 DIAGNOSIS — M47.22 OTHER SPONDYLOSIS WITH RADICULOPATHY, CERVICAL REGION: ICD-10-CM

## 2017-10-10 PROCEDURE — 99214 OFFICE O/P EST MOD 30 MIN: CPT

## 2017-10-10 PROCEDURE — 72070 X-RAY EXAM THORAC SPINE 2VWS: CPT

## 2017-11-21 ENCOUNTER — APPOINTMENT (OUTPATIENT)
Dept: ORTHOPEDIC SURGERY | Facility: CLINIC | Age: 52
End: 2017-11-21
Payer: COMMERCIAL

## 2017-11-21 VITALS
HEART RATE: 97 BPM | WEIGHT: 200 LBS | SYSTOLIC BLOOD PRESSURE: 137 MMHG | DIASTOLIC BLOOD PRESSURE: 74 MMHG | BODY MASS INDEX: 32.14 KG/M2 | HEIGHT: 66 IN

## 2017-11-21 DIAGNOSIS — S22.010D: ICD-10-CM

## 2017-11-21 DIAGNOSIS — G44.209 TENSION-TYPE HEADACHE, UNSPECIFIED, NOT INTRACTABLE: ICD-10-CM

## 2017-11-21 DIAGNOSIS — S22.060D WEDGE COMPRESSION FRACTURE OF T7-T8 VERTEBRA, SUBSEQUENT ENCOUNTER FOR FRACTURE WITH ROUTINE HEALING: ICD-10-CM

## 2017-11-21 PROCEDURE — 72070 X-RAY EXAM THORAC SPINE 2VWS: CPT

## 2017-11-21 PROCEDURE — 99214 OFFICE O/P EST MOD 30 MIN: CPT

## 2017-11-21 RX ORDER — NAPROXEN 500 MG/1
500 TABLET ORAL
Qty: 60 | Refills: 1 | Status: ACTIVE | COMMUNITY
Start: 2017-11-21 | End: 1900-01-01

## 2017-11-28 ENCOUNTER — OTHER (OUTPATIENT)
Age: 52
End: 2017-11-28

## 2017-12-02 ENCOUNTER — APPOINTMENT (OUTPATIENT)
Dept: MRI IMAGING | Facility: CLINIC | Age: 52
End: 2017-12-02

## 2017-12-02 ENCOUNTER — APPOINTMENT (OUTPATIENT)
Dept: MRI IMAGING | Facility: CLINIC | Age: 52
End: 2017-12-02
Payer: COMMERCIAL

## 2017-12-02 ENCOUNTER — OUTPATIENT (OUTPATIENT)
Dept: OUTPATIENT SERVICES | Facility: HOSPITAL | Age: 52
LOS: 1 days | End: 2017-12-02
Payer: COMMERCIAL

## 2017-12-02 DIAGNOSIS — Z00.8 ENCOUNTER FOR OTHER GENERAL EXAMINATION: ICD-10-CM

## 2017-12-02 PROCEDURE — 70551 MRI BRAIN STEM W/O DYE: CPT

## 2017-12-02 PROCEDURE — 70551 MRI BRAIN STEM W/O DYE: CPT | Mod: 26

## 2017-12-05 ENCOUNTER — APPOINTMENT (OUTPATIENT)
Dept: ORTHOPEDIC SURGERY | Facility: CLINIC | Age: 52
End: 2017-12-05

## 2018-02-23 ENCOUNTER — APPOINTMENT (OUTPATIENT)
Dept: ORTHOPEDIC SURGERY | Facility: CLINIC | Age: 53
End: 2018-02-23

## 2018-03-08 ENCOUNTER — RX RENEWAL (OUTPATIENT)
Age: 53
End: 2018-03-08

## 2018-03-08 RX ORDER — TIZANIDINE 4 MG/1
4 TABLET ORAL
Qty: 30 | Refills: 2 | Status: ACTIVE | COMMUNITY
Start: 2017-11-21 | End: 1900-01-01

## 2018-08-29 ENCOUNTER — OTHER (OUTPATIENT)
Age: 53
End: 2018-08-29

## 2018-09-07 ENCOUNTER — OTHER (OUTPATIENT)
Age: 53
End: 2018-09-07

## 2018-12-24 PROBLEM — S22.010D: Status: ACTIVE | Noted: 2017-06-28

## 2019-01-21 PROBLEM — S22.060D CLOSED WEDGE COMPRESSION FRACTURE OF SEVENTH THORACIC VERTEBRA WITH ROUTINE HEALING: Status: ACTIVE | Noted: 2017-06-28

## 2019-06-01 NOTE — ED PROVIDER NOTE - NORMAL, MLM
Patient Education     Laceration: Skin Adhesive  A laceration is a cut through the skin. You have a laceration that your healthcare provider has closed with skin adhesive, a type of skin glue.  Home care  You may take acetaminophen or ibuprofen for pain, unless your provider prescribed another pain medicine. If you have chronic liver or kidney disease or ever had a stomach ulcer or gastrointestinal bleeding, talk with your healthcare provider before using these medicines.  General care  · Keep the wound clean and dry. You may shower or bathe as usual, but don't use soaps, lotions, or ointments on the wound area. Don't scrub the wound. After bathing, pat the wound dry with a soft towel.  · Don't scratch, rub, or pick at the adhesive film. Don't place tape directly over the film.  · Don't apply liquids (such as peroxide), ointments, or creams to the wound while the film is in place.  · Most skin wounds heal without problems. However, an infection sometimes occurs despite proper treatment. Therefore, watch for the signs of infection listed below.  Follow-up care  Follow up with your healthcare provider, or as advised. The adhesive film or skin glue usually falls off in 5 to 10 days.  When to seek medical advice  Call your healthcare provider right away if any of these occur:  · Signs of infection:  ? Fever of 100.4ºF (38ºC) or higher, or as advised by your healthcare provider  ? Increasing pain in the wound  ? Increasing redness or swelling  ? Pus coming from the wound  · Wound bleeds more than a small amount or bleeding doesn’t stop  · Glue comes off earlier than expected and the wound edges come apart  · You feel numbness or weakness in the wound area that doesn’t go away  Date Last Reviewed: 6/1/2016  © 0360-5202 [employer]. [address] [place] [address], [place] [address]. All rights reserved. This information is not intended as a substitute for professional medical care. Always follow your healthcare  professional's instructions.          --------------------------------------------------------------------------------------------------------------  [facility]    Monday - Friday 8 a.m. - 8 p.m.  Saturday  8 a.m. - 4 p.m.  Sunday   Closed  -*-*-*-*-*-*-*-  The Urgent Care at [facility] is open on SUNDAYS: 8 a.m. to 4 p.m.  [address] [place]  -*-*-*-*-*-*-*-  [email or web address]  See current wait times for [facility] in real-time!  Reserve your waiting-room spot in line!   Receive text/email messages if our wait times are long,  so that you can do your waiting at home or work, instead of in our waiting room.     Note: This system does not guarantee an \"appointment time\" to see a provider. Also, patients may be called out of the waiting room \"ahead of turn\" in emergency situations, at discretion of Urgent Care staff.  ----------------  Thank you for choosing [facility] today.  We hope you had a pleasant experience and we look forward to serving your future needs.  If you receive a survey in the mail about today's services, we hope that you will take a few minutes to let us know about your experience.    · If you have any questions about your VISIT, please call [phone]    · If you have any questions about your BILL, please call [phone]    · If you need a copy of your MEDICAL RECORD, please call [phone]    --------------------------------------------------------------------------------------------------------------  UNLESS OTHERWISE INSTRUCTED BY YOUR URGENT CARE PROVIDER TODAY, all follow-up for your medical issues should be managed by your primary care provider. The Urgent Care does not manage chronic medical issues or refill medications. You are responsible for scheduling and keeping any necessary follow-up visits with your primary care provider after this visit today.    --------------------------------------------------------------------------------------------------------------  IF YOU WERE PRESCRIBED AN ANTIBIOTIC TODAY: We recommend taking an over-the-counter probiotic (Such as Florajen 3 for adults, or [id number] for children -- AVAILABLE IN THE [facility] and other local pharmacies too) once a day for the entire duration of your antibiotics, and continuing it for 2 weeks after the antibiotics are finished. This will help reduce your chance of developing antibiotic-related diarrhea and/or yeast infections.  --------------------------------------------------------------------------------------------------------------  IF YOU HAVE LAB RESULTS or XRAY REPORTS STILL PENDING: We typically call patients back only if (1) the results are \"significantly abnormal\", (2) we need to change your treatment plan based on the results, or (3) the report is different than what we told you during your visit. If we have not called you about your results 48 hours after your visit, you can call us at [phone] to check on the status.  --------------------------------------------------------------------------------------------------------------                scotty all pertinent systems normal

## 2020-12-15 PROBLEM — N76.0 BACTERIAL VAGINITIS: Status: RESOLVED | Noted: 2017-07-21 | Resolved: 2020-12-15
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
## 2023-07-19 NOTE — PHYSICAL THERAPY INITIAL EVALUATION ADULT - LEVEL OF INDEPENDENCE: STAND/SIT, REHAB EVAL
Problem: At Risk for Falls  Goal: # Patient does not fall  Outcome: Outcome Met, Continue evaluating goal progress toward completion  Note: Call light within reach, discussed importance of ambulation with staff, pt. Verbalized understanding. Hourly rounding with 5 P's      Problem: Pressure Injury, Risk for  Goal: # Skin remains intact  Outcome: Outcome Met, Continue evaluating goal progress toward completion  Note: Patient independent with positioning/shifting wt in bed, will continue to promote q2. Hr. Turning and reposition pillows in order to reduce possibility of skin breakdown.       supervision

## 2024-06-07 NOTE — H&P ADULT - NSHPLABSRESULTS_GEN_ALL_CORE
14.7   8.3   )-----------( 273      ( 09 Jun 2017 11:33 )             44.5     CXR: Pending   Xray L shoulder: Pending  CT brain: No acute intracranial pathology  CT chest, abd, pelvis c IVC: Pending 14.7   8.3   )-----------( 273      ( 09 Jun 2017 11:33 )             44.5   06-09    142  |  105  |  17.0  ----------------------------<  115  4.2   |  22.0  |  0.66    Ca    9.1      09 Jun 2017 11:33    TPro  7.0  /  Alb  4.1  /  TBili  0.4  /  DBili  x   /  AST  14  /  ALT  15  /  AlkPhos  105  06-09  PT/INR - ( 09 Jun 2017 11:33 )   PT: 10.1 sec;   INR: 0.92 ratio         PTT - ( 09 Jun 2017 11:33 )  PTT:28.9 sec  LIVER FUNCTIONS - ( 09 Jun 2017 11:33 )  Alb: 4.1 g/dL / Pro: 7.0 g/dL / ALK PHOS: 105 U/L / ALT: 15 U/L / AST: 14 U/L / GGT: x           CXR: Pending   Xray L shoulder: Pending  CT brain: No acute intracranial pathology  CT C-Spine: No  acute  fracture  dislocation  involving  the  cervical  spine.  1.3  cm  ill-defined  left  thyroid  lobe  nodule.  Outpatient  ultrasound  and  possibly  FNA  are  recommended.  CT chest, abd, pelvis c IVC: Pending 14.7   8.3   )-----------( 273      ( 09 Jun 2017 11:33 )             44.5   06-09    142  |  105  |  17.0  ----------------------------<  115  4.2   |  22.0  |  0.66    Ca    9.1      09 Jun 2017 11:33    TPro  7.0  /  Alb  4.1  /  TBili  0.4  /  DBili  x   /  AST  14  /  ALT  15  /  AlkPhos  105  06-09  PT/INR - ( 09 Jun 2017 11:33 )   PT: 10.1 sec;   INR: 0.92 ratio         PTT - ( 09 Jun 2017 11:33 )  PTT:28.9 sec  LIVER FUNCTIONS - ( 09 Jun 2017 11:33 )  Alb: 4.1 g/dL / Pro: 7.0 g/dL / ALK PHOS: 105 U/L / ALT: 15 U/L / AST: 14 U/L / GGT: x           CXR: No acute cardiopulmonary disease process  Xray L shoulder: Unremarkable left shoulder.  CT brain: No acute intracranial pathology  CT C-Spine: No  acute  fracture  dislocation  involving  the  cervical  spine.  1.3  cm  ill-defined  left  thyroid  lobe  nodule.  Outpatient  ultrasound  and  possibly  FNA  are  recommended.  CT chest, abd, pelvis c IVC: Pending Not applicable

## 2025-06-14 NOTE — DISCHARGE NOTE ADULT - INSTRUCTIONS
please continue to wear your brace while out of bed and ambulating. While on narcotics do not drive, operate heavy machinery , make any important decisions, drink alcohol, take any tylenol. Please do not drive until Dr. Vivas (Spine surgeon ) clears you to drive 4 = No assist / stand by assistance

## 2025-06-28 NOTE — ED PROVIDER NOTE - CRITICAL CARE PROVIDED
right
additional history taking/consultation with other physicians/direct patient care (not related to procedure)/interpretation of diagnostic studies/documentation